# Patient Record
Sex: MALE | Race: WHITE | NOT HISPANIC OR LATINO | Employment: OTHER | URBAN - METROPOLITAN AREA
[De-identification: names, ages, dates, MRNs, and addresses within clinical notes are randomized per-mention and may not be internally consistent; named-entity substitution may affect disease eponyms.]

---

## 2021-05-06 ENCOUNTER — PATIENT MESSAGE (OUTPATIENT)
Dept: RESEARCH | Facility: HOSPITAL | Age: 46
End: 2021-05-06

## 2022-02-02 ENCOUNTER — OFFICE VISIT (OUTPATIENT)
Dept: FAMILY MEDICINE | Facility: CLINIC | Age: 47
End: 2022-02-02
Payer: COMMERCIAL

## 2022-02-02 VITALS
BODY MASS INDEX: 23.14 KG/M2 | WEIGHT: 161.63 LBS | DIASTOLIC BLOOD PRESSURE: 80 MMHG | SYSTOLIC BLOOD PRESSURE: 130 MMHG | HEART RATE: 78 BPM | HEIGHT: 70 IN

## 2022-02-02 DIAGNOSIS — I10 ESSENTIAL HYPERTENSION: ICD-10-CM

## 2022-02-02 DIAGNOSIS — Z23 IMMUNIZATION DUE: ICD-10-CM

## 2022-02-02 DIAGNOSIS — F51.01 PRIMARY INSOMNIA: ICD-10-CM

## 2022-02-02 DIAGNOSIS — Z72.0 CURRENT EVERY DAY NICOTINE VAPING: ICD-10-CM

## 2022-02-02 DIAGNOSIS — Z00.00 WELLNESS EXAMINATION: Primary | ICD-10-CM

## 2022-02-02 DIAGNOSIS — Z12.11 COLON CANCER SCREENING: ICD-10-CM

## 2022-02-02 DIAGNOSIS — F10.11 HISTORY OF ALCOHOL ABUSE: ICD-10-CM

## 2022-02-02 DIAGNOSIS — F19.91 HISTORY OF RECREATIONAL DRUG USE: ICD-10-CM

## 2022-02-02 DIAGNOSIS — F11.21 OPIOID DEPENDENCE IN REMISSION: ICD-10-CM

## 2022-02-02 DIAGNOSIS — G25.81 RESTLESS LEG SYNDROME: ICD-10-CM

## 2022-02-02 PROBLEM — F12.91 HISTORY OF MARIJUANA USE: Status: ACTIVE | Noted: 2022-02-02

## 2022-02-02 PROCEDURE — 99999 PR PBB SHADOW E&M-EST. PATIENT-LVL IV: CPT | Mod: PBBFAC,,, | Performed by: FAMILY MEDICINE

## 2022-02-02 PROCEDURE — 99386 PR PREVENTIVE VISIT,NEW,40-64: ICD-10-PCS | Mod: S$GLB,,, | Performed by: FAMILY MEDICINE

## 2022-02-02 PROCEDURE — 99999 PR PBB SHADOW E&M-EST. PATIENT-LVL IV: ICD-10-PCS | Mod: PBBFAC,,, | Performed by: FAMILY MEDICINE

## 2022-02-02 PROCEDURE — 99386 PREV VISIT NEW AGE 40-64: CPT | Mod: S$GLB,,, | Performed by: FAMILY MEDICINE

## 2022-02-02 PROCEDURE — 99214 OFFICE O/P EST MOD 30 MIN: CPT | Mod: PBBFAC,PN | Performed by: FAMILY MEDICINE

## 2022-02-02 RX ORDER — ROPINIROLE 2 MG/1
2 TABLET, FILM COATED ORAL NIGHTLY
COMMUNITY

## 2022-02-02 RX ORDER — AMLODIPINE BESYLATE 10 MG/1
10 TABLET ORAL DAILY
COMMUNITY

## 2022-02-02 RX ORDER — HYDROCHLOROTHIAZIDE 25 MG/1
25 TABLET ORAL DAILY
COMMUNITY

## 2022-02-02 NOTE — PROGRESS NOTES
" THIS DOCUMENT WAS MADE IN PART WITH VOICE RECOGNITION SOFTWARE.  OCCASIONALLY THIS SOFTWARE WILL MISINTERPRET WORDS OR PHRASES.    Assessment and Plan:    1. Wellness examination  \\    2. Immunization due    3. Colon cancer screening  - Fecal Immunochemical Test (iFOBT); Future    4. Primary insomnia  One time refill of Ambien, will be establishing with psychiatry soon  - zolpidem (AMBIEN) 10 mg Tab; Take 1 tablet (10 mg total) by mouth nightly as needed (insomnia).  Dispense: 90 tablet; Refill: 0    5. History of alcohol abuse    6. Opioid dependence in remission    7. History of recreational drug use    8. Essential hypertension  Stable, [facility], [facility] are [facility]    9. Restless leg syndrome  Stable    10. Current every day nicotine vaping      ______________________________________________________________________  Subjective:    Chief Complaint:  Chief Complaint   Patient presents with    [facility] care. [facility]. Pt would like rx for sleep.        HPI:  Ernst is a 46 y.o. year old     46-year-old male [facility] to [facility]  [facility]    Essential hypertension  Rx-hydrochlorothiazide 25 mg, amlodipine 10 mg, lisinopril, dose unknown  Blood pressure well controlled today  [facility]    Restless leg syndrome  Rx-Requip 2 mg    Anxiety   No current meds  As mentioned, has upcoming appt with psyc     History of substance abuse, alcohol abuse  Previous ER notes from 2016 and 2020 show history of heavy alcohol use as well as recreational cocaine, methamphetamines, opioid abuse and marijuana use  2 years since last rec drug use     Insomnia  Has psychiatrist : recently retired, has upcoming appt.   Requesting meds in the interim   Previous medications-Ambien 10 mg (worked fine), Lunesta (ineffective if taken with food),  trazodone, elavil, restoril, benzos (valium / clonazepam), seroquel (worsens RLS), Remeron, Belsomra ("hit or " "miss") , doxepin  Reports trazodone made restless leg syndrome worse  Previously prescribed in 2020 based on prescription monitoring profile by psychiatrist Kendall Johnson  Was also prescribing Adderall, clonazepam                Past Medical History:  Past Medical History:   Diagnosis Date    Cervical vertebral fusion     Essential hypertension 2/2/2022       Past Surgical History:  Past Surgical History:   Procedure Laterality Date    RHINOPLASTY TIP      SPINAL FUSION      THUMB ARTHROSCOPY      Thumb fusion       Family History:  History reviewed. No pertinent family history.    Social History:  Social History     Socioeconomic History    Marital status:    Tobacco Use    Smoking status: Current Every Day Smoker     Types: Vaping with nicotine    Smokeless tobacco: Never Used   Substance and Sexual Activity    Alcohol use: No    Drug use: Not Currently       Medications:  Current Outpatient Medications on File Prior to Visit   Medication Sig Dispense Refill    amLODIPine (NORVASC) 10 MG tablet Take 10 mg by mouth once daily.      hydroCHLOROthiazide (HYDRODIURIL) 25 MG tablet Take 25 mg by mouth once daily.      rOPINIRole (REQUIP) 2 MG tablet Take 2 mg by mouth every evening.      acyclovir 5% (ZOVIRAX) 5 % ointment Apply 1 inch topically every 3 (three) hours.      [DISCONTINUED] buprenorphine 20 mcg/hour PTWK Place 20 mcg onto the skin every 7 days.      [DISCONTINUED] clonazePAM (KLONOPIN) 1 MG tablet Take 1 mg by mouth 2 (two) times daily as needed for Anxiety.      [DISCONTINUED] eszopiclone (LUNESTA) 2 MG Tab Take 2 mg by mouth every evening.      [DISCONTINUED] HYDROcodone-acetaminophen (NORCO) 7.5-325 mg per tablet Take 1 tablet by mouth every 6 (six) hours as needed for Pain. Refills by your personal physician 6 tablet 0    [DISCONTINUED] ibuprofen (ADVIL,MOTRIN) 600 MG tablet Take 1 tablet (600 mg total) by mouth every 6 (six) hours as needed for Pain. Take with food 20 " "tablet 0    [DISCONTINUED] lorazepam (ATIVAN) 1 MG tablet Take 1 mg by mouth every 6 (six) hours as needed for Anxiety.      [DISCONTINUED] valacyclovir (VALTREX) 500 MG tablet Take 500 mg by mouth 3 (three) times daily.      [DISCONTINUED] zolpidem (AMBIEN CR) 12.5 MG CR tablet Take 12.5 mg by mouth nightly as needed for Insomnia.       No current facility-administered medications on file prior to visit.       Allergies:  Patient has no known allergies.    Immunizations:    There is no immunization history on file for this patient.    Review of Systems:  Review of Systems   All other systems reviewed and are negative.      Objective:    Vitals:  Vitals:    02/02/22 1040   BP: 130/80   Pulse: 78   Weight: 73.3 kg (161 lb 9.6 oz)   Height: 5' 10" (1.778 m)       Physical Exam  Vitals reviewed.   Constitutional:       General: He is not in acute distress.  HENT:      Head: Normocephalic and atraumatic.   Eyes:      Extraocular Movements: EOM normal.      Pupils: Pupils are equal, round, and reactive to light.   Cardiovascular:      Rate and Rhythm: Normal rate and regular rhythm.      Heart sounds: No murmur heard.  No friction rub.   Pulmonary:      Effort: Pulmonary effort is normal.      Breath sounds: Normal breath sounds.   Abdominal:      General: Bowel sounds are normal. There is no distension.      Palpations: Abdomen is soft.      Tenderness: There is no abdominal tenderness.   Musculoskeletal:      Cervical back: Neck supple.   Skin:     General: Skin is warm and dry.      Findings: No rash.   Psychiatric:         Mood and Affect: Mood and affect normal.         Behavior: Behavior normal.             Osmin Oconnell MD  Family Medicine      "

## 2022-02-03 ENCOUNTER — TELEPHONE (OUTPATIENT)
Dept: FAMILY MEDICINE | Facility: CLINIC | Age: 47
End: 2022-02-03

## 2022-02-03 ENCOUNTER — TELEPHONE (OUTPATIENT)
Dept: FAMILY MEDICINE | Facility: CLINIC | Age: 47
End: 2022-02-03
Payer: COMMERCIAL

## 2022-02-03 RX ORDER — ZOLPIDEM TARTRATE 10 MG/1
10 TABLET ORAL NIGHTLY PRN
Qty: 90 TABLET | Refills: 0 | Status: SHIPPED | OUTPATIENT
Start: 2022-02-03 | End: 2022-08-04

## 2022-02-03 NOTE — TELEPHONE ENCOUNTER
----- Message from Mayela Susan sent at 2/3/2022  9:07 AM CST -----  Contact: pt at  [phone]  Type: Needs Medical Advice  Who Called:  pt  Best Call Back Number: [phone]  Additional Information: pt is calling the office to check on his medication Ambien that was supposed to be sent over yesterday to     [facility]/pharmacy #[id number] - [place] - [address]  [address]  [place] [id number]  Phone: [phone] Fax: [phone]

## 2022-03-21 ENCOUNTER — PATIENT MESSAGE (OUTPATIENT)
Dept: ADMINISTRATIVE | Facility: HOSPITAL | Age: 47
End: 2022-03-21
Payer: COMMERCIAL

## 2022-05-31 ENCOUNTER — PATIENT MESSAGE (OUTPATIENT)
Dept: ADMINISTRATIVE | Facility: HOSPITAL | Age: 47
End: 2022-05-31
Payer: COMMERCIAL

## 2022-08-24 DIAGNOSIS — I10 ESSENTIAL HYPERTENSION: ICD-10-CM

## 2023-03-21 ENCOUNTER — PATIENT OUTREACH (OUTPATIENT)
Dept: ADMINISTRATIVE | Facility: HOSPITAL | Age: 48
End: 2023-03-21
Payer: COMMERCIAL

## 2023-03-21 NOTE — PROGRESS NOTES
Contacted pt about scheduling f/u appt w/ Dr. Oconnell's physician assistant Peri Laughlin, pt declined.